# Patient Record
Sex: MALE | Race: WHITE | NOT HISPANIC OR LATINO | URBAN - METROPOLITAN AREA
[De-identification: names, ages, dates, MRNs, and addresses within clinical notes are randomized per-mention and may not be internally consistent; named-entity substitution may affect disease eponyms.]

---

## 2017-03-17 ENCOUNTER — APPOINTMENT (RX ONLY)
Dept: RURAL CLINIC 4 | Facility: CLINIC | Age: 62
Setting detail: DERMATOLOGY
End: 2017-03-17

## 2017-03-17 DIAGNOSIS — L57.0 ACTINIC KERATOSIS: ICD-10-CM

## 2017-03-17 DIAGNOSIS — D18.0 HEMANGIOMA: ICD-10-CM

## 2017-03-17 DIAGNOSIS — L57.8 OTHER SKIN CHANGES DUE TO CHRONIC EXPOSURE TO NONIONIZING RADIATION: ICD-10-CM

## 2017-03-17 DIAGNOSIS — D22 MELANOCYTIC NEVI: ICD-10-CM

## 2017-03-17 DIAGNOSIS — L82.1 OTHER SEBORRHEIC KERATOSIS: ICD-10-CM

## 2017-03-17 PROBLEM — D22.5 MELANOCYTIC NEVI OF TRUNK: Status: ACTIVE | Noted: 2017-03-17

## 2017-03-17 PROBLEM — D18.01 HEMANGIOMA OF SKIN AND SUBCUTANEOUS TISSUE: Status: ACTIVE | Noted: 2017-03-17

## 2017-03-17 PROCEDURE — ? PRESCRIPTION

## 2017-03-17 PROCEDURE — ? LIQUID NITROGEN

## 2017-03-17 PROCEDURE — 17000 DESTRUCT PREMALG LESION: CPT

## 2017-03-17 PROCEDURE — ? COUNSELING

## 2017-03-17 PROCEDURE — 17003 DESTRUCT PREMALG LES 2-14: CPT

## 2017-03-17 PROCEDURE — 99214 OFFICE O/P EST MOD 30 MIN: CPT | Mod: 25

## 2017-03-17 RX ORDER — INGENOL MEBUTATE 150 UG/G
GEL TOPICAL
Qty: 1 | Refills: 0 | Status: ERX | COMMUNITY
Start: 2017-03-17

## 2017-03-17 RX ADMIN — INGENOL MEBUTATE: 150 GEL TOPICAL at 14:11

## 2017-03-17 ASSESSMENT — LOCATION DETAILED DESCRIPTION DERM
LOCATION DETAILED: LEFT POSTERIOR PARIETAL SCALP
LOCATION DETAILED: EPIGASTRIC SKIN
LOCATION DETAILED: RIGHT INFERIOR MEDIAL FOREHEAD
LOCATION DETAILED: MID-OCCIPITAL SCALP
LOCATION DETAILED: PERIUMBILICAL SKIN
LOCATION DETAILED: POSTERIOR MID-PARIETAL SCALP
LOCATION DETAILED: RIGHT SUPERIOR PARIETAL SCALP
LOCATION DETAILED: LEFT LATERAL ABDOMEN

## 2017-03-17 ASSESSMENT — LOCATION SIMPLE DESCRIPTION DERM
LOCATION SIMPLE: POSTERIOR SCALP
LOCATION SIMPLE: SCALP
LOCATION SIMPLE: RIGHT FOREHEAD
LOCATION SIMPLE: ABDOMEN

## 2017-03-17 ASSESSMENT — LOCATION ZONE DERM
LOCATION ZONE: SCALP
LOCATION ZONE: FACE
LOCATION ZONE: TRUNK

## 2017-03-17 NOTE — PROCEDURE: LIQUID NITROGEN
Duration Of Freeze Thaw-Cycle (Seconds): 0
Consent: The patient's consent was obtained including but not limited to risks of crusting, scabbing, blistering, scarring, darker or lighter pigmentary change, recurrence, incomplete removal and infection.
Post-Care Instructions: I reviewed with the patient in detail post-care instructions. Patient is to wear sunprotection, and avoid picking at any of the treated lesions. Pt may apply Vaseline to crusted or scabbing areas.
Detail Level: Simple
Render Post-Care Instructions In Note?: no

## 2017-03-17 NOTE — PROCEDURE: MIPS QUALITY
Detail Level: Detailed
Quality 265: Biopsy Follow-Up: Biopsy results reviewed, communicated, tracked, and documented
Quality 110: Preventive Care And Screening: Influenza Immunization: Influenza Immunization previously received during influenza season
Quality 111:Pneumonia Vaccination Status For Older Adults: Pneumococcal Vaccination not Administered or Previously Received, Reason not Otherwise Specified
Quality 130: Documentation Of Current Medications In The Medical Record: Current Medications Documented
Quality 226: Preventive Care And Screening: Tobacco Use: Screening And Cessation Intervention: Patient screened for tobacco and never smoked
Quality 131: Pain Assessment And Follow-Up: Pain assessment documented as positive using a standardized tool, follow-up plan not documented, reason not given
Quality 47: Advance Care Plan: Advance Care Planning discussed and documented in the medical record; patient did not wish or was not able to name a surrogate decision maker or provide an advance care plan.

## 2018-10-24 ENCOUNTER — APPOINTMENT (RX ONLY)
Dept: RURAL CLINIC 4 | Facility: CLINIC | Age: 63
Setting detail: DERMATOLOGY
End: 2018-10-24

## 2018-10-24 DIAGNOSIS — D22 MELANOCYTIC NEVI: ICD-10-CM

## 2018-10-24 DIAGNOSIS — L30.4 ERYTHEMA INTERTRIGO: ICD-10-CM

## 2018-10-24 DIAGNOSIS — L82.1 OTHER SEBORRHEIC KERATOSIS: ICD-10-CM

## 2018-10-24 DIAGNOSIS — D18.0 HEMANGIOMA: ICD-10-CM

## 2018-10-24 DIAGNOSIS — L57.0 ACTINIC KERATOSIS: ICD-10-CM

## 2018-10-24 DIAGNOSIS — L08.9 LOCAL INFECTION OF THE SKIN AND SUBCUTANEOUS TISSUE, UNSPECIFIED: ICD-10-CM

## 2018-10-24 DIAGNOSIS — L57.8 OTHER SKIN CHANGES DUE TO CHRONIC EXPOSURE TO NONIONIZING RADIATION: ICD-10-CM

## 2018-10-24 PROBLEM — D18.01 HEMANGIOMA OF SKIN AND SUBCUTANEOUS TISSUE: Status: ACTIVE | Noted: 2018-10-24

## 2018-10-24 PROBLEM — D22.5 MELANOCYTIC NEVI OF TRUNK: Status: ACTIVE | Noted: 2018-10-24

## 2018-10-24 PROCEDURE — 17003 DESTRUCT PREMALG LES 2-14: CPT

## 2018-10-24 PROCEDURE — ? LIQUID NITROGEN

## 2018-10-24 PROCEDURE — ? COUNSELING

## 2018-10-24 PROCEDURE — 17000 DESTRUCT PREMALG LESION: CPT

## 2018-10-24 PROCEDURE — 99214 OFFICE O/P EST MOD 30 MIN: CPT | Mod: 25

## 2018-10-24 PROCEDURE — ? TREATMENT REGIMEN

## 2018-10-24 PROCEDURE — ? ORDER TESTS

## 2018-10-24 ASSESSMENT — LOCATION DETAILED DESCRIPTION DERM
LOCATION DETAILED: LEFT SUPERIOR OCCIPITAL SCALP
LOCATION DETAILED: PERIUMBILICAL SKIN
LOCATION DETAILED: RIGHT INFERIOR MEDIAL FOREHEAD
LOCATION DETAILED: POSTERIOR MID-PARIETAL SCALP
LOCATION DETAILED: LEFT LATERAL ABDOMEN
LOCATION DETAILED: EPIGASTRIC SKIN
LOCATION DETAILED: LEFT AXILLARY VAULT

## 2018-10-24 ASSESSMENT — LOCATION ZONE DERM
LOCATION ZONE: SCALP
LOCATION ZONE: TRUNK
LOCATION ZONE: FACE
LOCATION ZONE: AXILLAE

## 2018-10-24 ASSESSMENT — LOCATION SIMPLE DESCRIPTION DERM
LOCATION SIMPLE: ABDOMEN
LOCATION SIMPLE: POSTERIOR SCALP
LOCATION SIMPLE: LEFT OCCIPITAL SCALP
LOCATION SIMPLE: RIGHT FOREHEAD
LOCATION SIMPLE: LEFT AXILLARY VAULT

## 2018-10-24 NOTE — PROCEDURE: LIQUID NITROGEN
Render Post-Care Instructions In Note?: no
Detail Level: Detailed
Post-Care Instructions: I reviewed with the patient in detail post-care instructions. Patient is to wear sunprotection, and avoid picking at any of the treated lesions. Pt may apply Vaseline to crusted or scabbing areas.
Consent: The patient's consent was obtained including but not limited to risks of crusting, scabbing, blistering, scarring, darker or lighter pigmentary change, recurrence, incomplete removal and infection.
Number Of Freeze-Thaw Cycles: 2 freeze-thaw cycles
Duration Of Freeze Thaw-Cycle (Seconds): 2

## 2018-10-24 NOTE — PROCEDURE: TREATMENT REGIMEN
Plan: Patient will apply Alcortin twice daily for 10 days on 5 days off repeat cycle if needed
Detail Level: Detailed
Samples Given: Alcortin

## 2018-10-24 NOTE — PROCEDURE: ORDER TESTS
Billing Type: [employer]
Expected Date Of Service: 10/24/2018
Bill For Surgical Tray: no
Performing Laboratory: [id number]

## 2020-05-22 ENCOUNTER — NEW PATIENT COMPREHENSIVE (OUTPATIENT)
Dept: URBAN - METROPOLITAN AREA CLINIC 39 | Facility: CLINIC | Age: 65
End: 2020-05-22

## 2020-05-22 DIAGNOSIS — H43.312: ICD-10-CM

## 2020-05-22 DIAGNOSIS — H25.812: ICD-10-CM

## 2020-05-22 DIAGNOSIS — H52.4: ICD-10-CM

## 2020-05-22 DIAGNOSIS — H04.123: ICD-10-CM

## 2020-05-22 DIAGNOSIS — H52.03: ICD-10-CM

## 2020-05-22 DIAGNOSIS — H25.811: ICD-10-CM

## 2020-05-22 PROCEDURE — 92015 DETERMINE REFRACTIVE STATE: CPT

## 2020-05-22 PROCEDURE — 92004 COMPRE OPH EXAM NEW PT 1/>: CPT

## 2020-05-22 ASSESSMENT — TONOMETRY
OS_IOP_MMHG: 13
OD_IOP_MMHG: 14

## 2020-05-22 ASSESSMENT — VISUAL ACUITY
OD_CC: J1+
OS_SC: 20/50+2
OS_CC: 20/20
OD_SC: 20/40
OS_CC: J1
OS_SC: J8
OD_CC: 20/20
OD_SC: J8

## 2021-05-19 NOTE — PATIENT DISCUSSION
Has inflammation and will need to start pred QID OS again. Will send for more labs and blood work. Will also send to Dr. Haven High for a second opinion.

## 2021-06-09 NOTE — PATIENT DISCUSSION
Has inflammation and will need to start pred QID OS again. Will send for more labs and blood work. Will also send to Dr. Mirna Nicole for a second opinion.

## 2022-02-24 ENCOUNTER — EMERGENCY VISIT (OUTPATIENT)
Dept: URBAN - METROPOLITAN AREA CLINIC 39 | Facility: CLINIC | Age: 67
End: 2022-02-24

## 2022-02-24 ENCOUNTER — CONSULTATION/EVALUATION (OUTPATIENT)
Dept: URBAN - METROPOLITAN AREA CLINIC 35 | Facility: CLINIC | Age: 67
End: 2022-02-24

## 2022-02-24 DIAGNOSIS — H35.30: ICD-10-CM

## 2022-02-24 DIAGNOSIS — H43.813: ICD-10-CM

## 2022-02-24 DIAGNOSIS — H33.312: ICD-10-CM

## 2022-02-24 DIAGNOSIS — H04.123: ICD-10-CM

## 2022-02-24 DIAGNOSIS — H33.321: ICD-10-CM

## 2022-02-24 DIAGNOSIS — H43.811: ICD-10-CM

## 2022-02-24 DIAGNOSIS — H33.311: ICD-10-CM

## 2022-02-24 PROCEDURE — 92250 FUNDUS PHOTOGRAPHY W/I&R: CPT

## 2022-02-24 PROCEDURE — 92134 CPTRZ OPH DX IMG PST SGM RTA: CPT

## 2022-02-24 PROCEDURE — 99214 OFFICE O/P EST MOD 30 MIN: CPT

## 2022-02-24 PROCEDURE — 67145 PROPH RTA DTCHMNT PC: CPT

## 2022-02-24 PROCEDURE — 92014 COMPRE OPH EXAM EST PT 1/>: CPT

## 2022-02-24 ASSESSMENT — VISUAL ACUITY
OS_CC: 20/20
OD_CC: 20/20
OS_CC: 20/20
OD_CC: 20/20

## 2022-02-24 ASSESSMENT — TONOMETRY
OD_IOP_MMHG: 14
OS_IOP_MMHG: 14
OS_IOP_MMHG: 14
OD_IOP_MMHG: 14

## 2022-02-25 ENCOUNTER — CLINIC PROCEDURE ONLY (OUTPATIENT)
Dept: URBAN - METROPOLITAN AREA CLINIC 43 | Facility: CLINIC | Age: 67
End: 2022-02-25

## 2022-02-25 DIAGNOSIS — H33.311: ICD-10-CM

## 2022-02-25 PROCEDURE — 67145 PROPH RTA DTCHMNT PC: CPT

## 2022-02-25 ASSESSMENT — TONOMETRY
OS_IOP_MMHG: 10
OD_IOP_MMHG: 12

## 2022-03-03 ENCOUNTER — POST-OP (OUTPATIENT)
Dept: URBAN - METROPOLITAN AREA CLINIC 35 | Facility: CLINIC | Age: 67
End: 2022-03-03

## 2022-03-03 DIAGNOSIS — H04.123: ICD-10-CM

## 2022-03-03 DIAGNOSIS — H35.033: ICD-10-CM

## 2022-03-03 DIAGNOSIS — H43.813: ICD-10-CM

## 2022-03-03 DIAGNOSIS — H35.30: ICD-10-CM

## 2022-03-03 DIAGNOSIS — H33.312: ICD-10-CM

## 2022-03-03 DIAGNOSIS — H33.311: ICD-10-CM

## 2022-03-03 DIAGNOSIS — H35.09: ICD-10-CM

## 2022-03-03 PROCEDURE — 92134 CPTRZ OPH DX IMG PST SGM RTA: CPT

## 2022-03-03 PROCEDURE — 99024 POSTOP FOLLOW-UP VISIT: CPT

## 2022-03-03 ASSESSMENT — VISUAL ACUITY
OD_CC: 20/40
OS_CC: 20/20

## 2022-03-03 ASSESSMENT — TONOMETRY
OD_IOP_MMHG: 12
OS_IOP_MMHG: 14

## 2022-03-28 ENCOUNTER — COMPREHENSIVE EXAM (OUTPATIENT)
Dept: URBAN - METROPOLITAN AREA CLINIC 40 | Facility: CLINIC | Age: 67
End: 2022-03-28

## 2022-03-28 DIAGNOSIS — H33.312: ICD-10-CM

## 2022-03-28 DIAGNOSIS — H43.813: ICD-10-CM

## 2022-03-28 DIAGNOSIS — H35.033: ICD-10-CM

## 2022-03-28 DIAGNOSIS — H52.4: ICD-10-CM

## 2022-03-28 DIAGNOSIS — H35.09: ICD-10-CM

## 2022-03-28 DIAGNOSIS — H04.123: ICD-10-CM

## 2022-03-28 DIAGNOSIS — H52.03: ICD-10-CM

## 2022-03-28 DIAGNOSIS — H35.30: ICD-10-CM

## 2022-03-28 DIAGNOSIS — H33.311: ICD-10-CM

## 2022-03-28 DIAGNOSIS — H52.203: ICD-10-CM

## 2022-03-28 PROCEDURE — 92015 DETERMINE REFRACTIVE STATE: CPT

## 2022-03-28 PROCEDURE — 92014 COMPRE OPH EXAM EST PT 1/>: CPT

## 2022-03-28 ASSESSMENT — VISUAL ACUITY
OU_SC: J5
OS_CC: 20/20
OD_SC: 20/30-2
OU_CC: J1+
OD_SC: J5
OS_CC: J1+
OD_CC: J1+
OS_SC: J6
OD_CC: 20/20
OS_SC: 20/40
OU_CC: 20/20
OU_SC: 20/25

## 2022-03-28 ASSESSMENT — TONOMETRY
OS_IOP_MMHG: 14
OD_IOP_MMHG: 14

## 2022-06-15 ENCOUNTER — ESTABLISHED PATIENT (OUTPATIENT)
Dept: URBAN - METROPOLITAN AREA CLINIC 39 | Facility: CLINIC | Age: 67
End: 2022-06-15

## 2022-06-15 DIAGNOSIS — H35.09: ICD-10-CM

## 2022-06-15 DIAGNOSIS — H35.033: ICD-10-CM

## 2022-06-15 DIAGNOSIS — H43.813: ICD-10-CM

## 2022-06-15 DIAGNOSIS — H33.313: ICD-10-CM

## 2022-06-15 DIAGNOSIS — H35.30: ICD-10-CM

## 2022-06-15 PROCEDURE — 99214 OFFICE O/P EST MOD 30 MIN: CPT

## 2022-06-15 PROCEDURE — 92250 FUNDUS PHOTOGRAPHY W/I&R: CPT

## 2022-06-15 ASSESSMENT — VISUAL ACUITY
OD_CC: 20/25
OS_CC: 20/20

## 2022-06-15 ASSESSMENT — TONOMETRY
OS_IOP_MMHG: 15
OD_IOP_MMHG: 14

## 2022-12-21 ENCOUNTER — PREPPED CHART (OUTPATIENT)
Dept: URBAN - METROPOLITAN AREA CLINIC 39 | Facility: CLINIC | Age: 67
End: 2022-12-21

## 2024-03-29 ENCOUNTER — COMPREHENSIVE EXAM (OUTPATIENT)
Dept: URBAN - METROPOLITAN AREA CLINIC 39 | Facility: CLINIC | Age: 69
End: 2024-03-29

## 2024-03-29 DIAGNOSIS — H04.123: ICD-10-CM

## 2024-03-29 DIAGNOSIS — H35.30: ICD-10-CM

## 2024-03-29 DIAGNOSIS — H52.03: ICD-10-CM

## 2024-03-29 DIAGNOSIS — H35.09: ICD-10-CM

## 2024-03-29 DIAGNOSIS — H33.313: ICD-10-CM

## 2024-03-29 DIAGNOSIS — H35.033: ICD-10-CM

## 2024-03-29 DIAGNOSIS — H52.203: ICD-10-CM

## 2024-03-29 DIAGNOSIS — D31.31: ICD-10-CM

## 2024-03-29 DIAGNOSIS — H43.813: ICD-10-CM

## 2024-03-29 DIAGNOSIS — H52.4: ICD-10-CM

## 2024-03-29 PROCEDURE — 92015 DETERMINE REFRACTIVE STATE: CPT

## 2024-03-29 PROCEDURE — 92014 COMPRE OPH EXAM EST PT 1/>: CPT

## 2024-03-29 ASSESSMENT — VISUAL ACUITY
OS_CC: J1
OD_CC: J1
OD_CC: 20/20
OS_CC: 20/20

## 2024-03-29 ASSESSMENT — TONOMETRY
OD_IOP_MMHG: 13
OS_IOP_MMHG: 13

## 2024-04-03 NOTE — PATIENT DISCUSSION
Pt passed 3/31/24   Retinal tear and detachment warning symptoms reviewed and patient instructed to call immediately if increasing floaters, flashes, or decreasing peripheral vision.

## 2025-04-09 ENCOUNTER — COMPREHENSIVE EXAM (OUTPATIENT)
Age: 70
End: 2025-04-09

## 2025-04-09 DIAGNOSIS — H35.09: ICD-10-CM

## 2025-04-09 DIAGNOSIS — H52.4: ICD-10-CM

## 2025-04-09 DIAGNOSIS — H04.123: ICD-10-CM

## 2025-04-09 DIAGNOSIS — D31.31: ICD-10-CM

## 2025-04-09 DIAGNOSIS — H33.313: ICD-10-CM

## 2025-04-09 DIAGNOSIS — H43.813: ICD-10-CM

## 2025-04-09 DIAGNOSIS — H52.203: ICD-10-CM

## 2025-04-09 DIAGNOSIS — H35.033: ICD-10-CM

## 2025-04-09 DIAGNOSIS — H52.03: ICD-10-CM

## 2025-04-09 DIAGNOSIS — H35.30: ICD-10-CM

## 2025-04-09 DIAGNOSIS — H25.813: ICD-10-CM

## 2025-04-09 PROCEDURE — 92015 DETERMINE REFRACTIVE STATE: CPT

## 2025-04-09 PROCEDURE — 92014 COMPRE OPH EXAM EST PT 1/>: CPT
